# Patient Record
Sex: MALE | Race: WHITE
[De-identification: names, ages, dates, MRNs, and addresses within clinical notes are randomized per-mention and may not be internally consistent; named-entity substitution may affect disease eponyms.]

---

## 2019-10-08 ENCOUNTER — HOSPITAL ENCOUNTER (EMERGENCY)
Dept: HOSPITAL 41 - JD.ED | Age: 34
Discharge: HOME | End: 2019-10-08
Payer: COMMERCIAL
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
## 2019-10-08 DIAGNOSIS — Z91.018: ICD-10-CM
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
## 2019-10-08 DIAGNOSIS — F17.210: ICD-10-CM

## 2019-10-08 DIAGNOSIS — S91.341A: ICD-10-CM

## 2019-10-08 DIAGNOSIS — Z23: ICD-10-CM

## 2019-10-08 DIAGNOSIS — F31.2: ICD-10-CM

## 2019-10-08 DIAGNOSIS — S91.342A: Primary | ICD-10-CM
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
## 2019-10-08 DIAGNOSIS — Z79.899: ICD-10-CM

## 2019-10-08 PROCEDURE — 90700 DTAP VACCINE < 7 YRS IM: CPT

## 2019-10-08 PROCEDURE — 96361 HYDRATE IV INFUSION ADD-ON: CPT

## 2019-10-08 PROCEDURE — 90471 IMMUNIZATION ADMIN: CPT

## 2019-10-08 PROCEDURE — 96375 TX/PRO/DX INJ NEW DRUG ADDON: CPT

## 2019-10-08 PROCEDURE — 96376 TX/PRO/DX INJ SAME DRUG ADON: CPT

## 2019-10-08 PROCEDURE — 99283 EMERGENCY DEPT VISIT LOW MDM: CPT

## 2019-10-08 PROCEDURE — 73700 CT LOWER EXTREMITY W/O DYE: CPT

## 2019-10-08 PROCEDURE — 96374 THER/PROPH/DIAG INJ IV PUSH: CPT

## 2019-10-08 PROCEDURE — 99152 MOD SED SAME PHYS/QHP 5/>YRS: CPT

## 2019-10-08 PROCEDURE — 99153 MOD SED SAME PHYS/QHP EA: CPT

## 2019-10-08 NOTE — CT
CT right foot

 

Technique: Multiple axial sections through the right foot were 

obtained.  Reconstructed coronal and sagittal images were obtained.

 

Findings: Small superficial foreign body seen slightly proximal to the

 heel along the lateral side measuring about 1.3 mm.  

 

Second small foreign body superficial in location is seen more 

distally within the sole of the foot laterally.  This measures about 

1.3 mm in size.

 

Larger foreign body is seen within the ball of the right foot medially

 which again is superficial in location and measuring about 2.3 mm.

 

Largest abnormality is within the mid foot slightly past the ball of 

the foot and measures 5 mm.

 

Small superficial foreign body is seen within the medial base of the 

first toe.

 

No additional foreign body is seen within the right foot.  No fracture

 or other bony abnormality is identified.

 

Impression:

1.  Five small foreign bodies within the right foot as described 

above.  These are all located along the sole of the foot.

 

Diagnostic code #3

## 2019-10-08 NOTE — CT
CT left foot



Technique: Multiple axial sections through the left foot were obtained.  
Reconstructed coronal and sagittal images were reviewed.



Findings: Small foreign body which appears to be on top of the skin within the 
heel of the foot.  This measures about 8 mm.

 

Superficially located foreign body is seen more medially within the ball of the 
foot measuring 4.3 mm.  



Two foreign bodies are seen more distally within the ball of the foot one being 
located within the fat and is deep to the skin measuring 4 mm.  Additional 
abnormality is seen in this same region in a more midline location of the sole 
the foot measuring 3.3 mm and is located within the skin. 



Small superficial foreign body is seen slightly past the heel within the mid 
aspect of the foot measuring 1.5 mm located within the skin.  



Very minimal foreign body is seen within the lateral ball of the foot too small 
to accurately measure.  



Larger abnormality is seen at the level of the mid proximal phalanx between the 
second and third digits within the distal ball of the foot.  This is located 
slightly deep to the skin within the subcutaneous fat measuring 2.9 mm.  



Two adjacent foreign bodies are seen within the first toe slightly medial in 
location and located at the level of the distal phalanx.  Largest finding 
measures 2.5 mm and adjacent finding measures 1 mm or less.



No bony abnormality is seen.



Impression:

1.  Nine small foreign bodies within the sole of the foot as noted above. 



Diagnostic code #3
MTDD
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
## 2019-10-09 NOTE — CT
CT left foot



Technique: Multiple axial sections through the left foot were obtained.  
Reconstructed coronal and sagittal images were reviewed.



Findings:  Foreign body within the deep soft tissues of the heel persist from 
previous exam.  This measures about 4 mm in size.  This is located 
approximately 1.1 cm below the skin.  



Two additional very small foreign bodies are seen within the skin within the 
medial heel.



Small lesion within the skin is seen which is within the mid sole of the foot 
measuring 2.4 mm in size.  This is located within the lateral aspect.  



Additional foreign body is seen within the ball of the foot extending from the 
skin deep into the soft tissues by approximately 1.3 cm.  This is located 
medially.



Diffuse subcutaneous soft tissue swelling as well as skin thickening is seen.  
No bony abnormality is seen.



Impression:

1.  Five persisting foreign bodies within the left foot.  Largest abnormality 
within the medial ball of the foot measuring 1.3 cm.



Diagnostic code #3
MTDD

## 2019-10-09 NOTE — CT
CT right foot



Technique: Multiple axial sections through the right foot were obtained.  
Reconstructed coronal and sagittal images were obtained.



Comparison: Previous study performed earlier on the same day.



Findings: Small foreign bodies are seen superficially within the first toe at 
the level of the IP joint.  This finding measures about 2 mm in size.  



Small foreign body noted laterally at the level of the distal fifth metatarsal 
head which is linear in configuration with greatest measurement of 4 mm.  



Two small adjacent foreign bodies are seen superficially within the mid sole of 
the foot at the level of the base of the fifth metatarsal along the lateral 
aspect.  These measure 1-2 mm in size.  



Very minimal foreign body is noted within the heel measuring about 1 mm in size 
located within the skin.



Soft tissue swelling is seen within the subcutaneous tissues as well as mild 
skin thickening.  No acute bony abnormality is seen.



Impression:

1.  Five small to very small foreign bodies persist within the right foot.  
Larger foreign bodies appear to have been removed.  Some foreign bodies appear 
to represent an interval change from prior exam which most likely represent 
differences in CT sectioning due to their small size.

2.  Soft tissue swelling within the subcutaneous tissues as well as skin 
thickening.



Diagnostic code #3
MTDD

## 2020-01-29 ENCOUNTER — HOSPITAL ENCOUNTER (EMERGENCY)
Dept: HOSPITAL 41 - JD.ED | Age: 35
LOS: 1 days | Discharge: HOME | End: 2020-01-30
Payer: COMMERCIAL

## 2020-01-29 DIAGNOSIS — Z79.899: ICD-10-CM

## 2020-01-29 DIAGNOSIS — F31.9: ICD-10-CM

## 2020-01-29 DIAGNOSIS — F17.210: ICD-10-CM

## 2020-01-29 DIAGNOSIS — F20.9: Primary | ICD-10-CM

## 2020-01-29 DIAGNOSIS — Z91.018: ICD-10-CM

## 2020-01-29 PROCEDURE — 99283 EMERGENCY DEPT VISIT LOW MDM: CPT
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
## 2020-08-03 NOTE — EDM.PDOCBH
ED HPI GENERAL MEDICAL PROBLEM





- General


Chief Complaint: Behavioral/Psych


Stated Complaint: PSYCH PT OFF MEDS


Time Seen by Provider: 08/03/20 20:14


Source of Information: Reports: Patient, Family (Friend)


History Limitations: Reports: No Limitations





- History of Present Illness


INITIAL COMMENTS - FREE TEXT/NARRATIVE: 





Mr. Reinoso is a very pleasant 34-year-old gentleman with a past medical history 

significant for bipolar I disorder and schizophrenia, both diagnosed when he was

20 years of age, with at least 10 hospitalizations for decompensation of 

symptoms when noncompliant with medications, most recently this last spring, who

is now brought to the ED by his friend and roommate, after the patient left 

their residence more than a week ago.  The patient's friend ordinarily gives the

patient his medications, however, after he left, she was unable to, therefore he

has been off of his psychiatric medications for more than a week.  The patient 

states that he met a girl, and that they have been snorting and smoking 

methamphetamine and drinking alcohol.  Whether or not any of that is true is 

questionable, although the patient's friend does confirm that the patient has a 

significant history of drug abuse.  The patient's friend tells me that she and 

her roommates have been looking for the patient for the past week, and that they

then found him today either in or under a drainage viaduct this evening.  He 

acknowledges that he has not really eaten in the past week.





Here in the ED, the patient is found to be hemodynamically stable, afebrile, 

saturating 100% on room air.  He has well-developed scabs on his upper right 

forehead, chest, abdomen, bilateral forearms, bilateral knees, and left shin.  

He states that he fell down a concrete dam about a week ago.





Other than his abrasive injuries and psychiatric issues, the patient denies 

recent fever, chills, sore throat, ear pain, nasal or sinus congestion, cough, 

dyspnea, chest pain, palpitations, nausea, vomiting, constipation, diarrhea, 

abdominal pain, urinary symptoms, recent weight gain or weight loss, recent 

bloody bowel movements or black bowel movements, recent joint aches, headaches, 

or rashes.








The patient does not have a PCP.


His Psychiatrist is Dr. Bernie Lopez.  He last saw her on 6/15/2020.











- Related Data


                                    Allergies











Allergy/AdvReac Type Severity Reaction Status Date / Time


 


tree nut Allergy  Airway Verified 08/03/20 20:08





   Tightness  











Home Meds: 


                                    Home Meds





OLANZapine [ZyPREXA] 2.5 mg PO DAILY 10/08/19 [History]


ziprasidone HCL [Geodon] 20 mg PO BID 10/08/19 [History]


ClonazePAM [KlonoPIN] 0.5 mg PO BID 08/03/20 [History]


Divalproex Sodium [Divalproex Sodium ER] 2,000 mg PO BEDTIME 08/03/20 [History]


cloNIDine HCL [Clonidine HCl] 0.1 mg PO BEDTIME 08/03/20 [History]











Past Medical History


Gastrointestinal History: Reports: Hemorrhoids


Psychiatric History: Reports: Anxiety, Bipolar (Type I), Schizophrenia, Suicide 

Attempt (by slitting throat)





- Past Surgical History


GI Surgical History: Reports: Appendectomy, Other (See Below) (Hemorrhoidectomy)





Social & Family History





- Tobacco Use


Smoking Status *Q: Current Every Day Smoker


Years of Tobacco use: 22


Packs/Tins Daily: 1





- Caffeine Use


Caffeine Use: Reports: Coffee





- Alcohol Use


Alcohol Use History: Yes


Days Per Week of Alcohol Use: 7


Number of Drinks Per Day: 6


Total Drinks Per Week: 42





- Recreational Drug Use


Recreational Drug Use: Yes


Drug Use in Last 12 Months: Yes


Recreational Drug Type: Reports: Ecstasy (last took in 2018), Heroin (last 

snorted, smoked, injected early 2020), LSD (Acid) (last took when 16 yrs old), 

Marijuana/Hashish (last smoked 7/30/2020), Methamphetamine (last injected around

 7/28/2020), Psilocybin (Mushrooms) (last took when in his 20s), Other (see 

below) (Last abused opioids in 2018)





- Living Situation & Occupation


Living situation: Reports: Single, Other (with friends)


Occupation: Unemployed





ED ROS GENERAL





- Review of Systems


Review Of Systems: Comprehensive ROS is negative, except as noted in HPI.





ED EXAM, BEHAVIORAL HEALTH





- Physical Exam


Exam: See Below


Exam Limited By: No Limitations


General Appearance: Alert, No Apparent Distress, Thin


Eye Exam: Bilateral Eye: EOMI, Normal Inspection, PERRL


Ears: Normal External Exam, Normal Canal, Hearing Grossly Normal, Normal TMs


Nose: Normal Inspection, Normal Mucosa, No Blood


Throat/Mouth: Normal Inspection, Normal Lips, Normal Teeth, Normal Gums, Normal 

Oropharynx, Normal Voice, No Airway Compromise


Head: Normocephalic, Other (Well-developed scab on the upper right forehead)


Neck: Normal Inspection, Supple, Non-Tender, Full Range of Motion


Respiratory/Chest: No Respiratory Distress, Lungs Clear, Normal Breath Sounds, 

No Accessory Muscle Use, Other (Extensive well developed a scab extending from 

the lower chest to the lower abdomen)


Cardiovascular: Normal Peripheral Pulses, Regular Rate, Rhythm, No Edema, No 

Gallop, No JVD, No Murmur, No Rub


GI/Abdominal: Normal Bowel Sounds, Soft, Non-Tender, No Organomegaly, No D

istention, No Abnormal Bruit, No Mass


 (Male) Exam: Deferred


Rectal (Males) Exam: Deferred


Back Exam: Normal Inspection, Full Range of Motion, NT


Extremities: Normal Range of Motion, No Pedal Edema, Normal Capillary Refill, 

Other (Extensive scab on the volar aspect of the left forearm, and on the 

proximal volar aspect of the right forearm.  Scabs are present on both knees, 

and down the patient's left shin.)


Neurological: Alert, CN II-XII Intact, No Motor/Sensory Deficits, Oriented x 3


Psychiatric: Flat Affect


Skin Exam: Warm, Dry, Normal color, No rash





EKG INTERPRETATION


EKG Date: 08/03/20


Time: 21:16


Rhythm: NSR


Rate (Beats/Min): 88


Axis: Normal


P-Wave: Present


QRS: Normal


ST-T: Normal


QT: Normal


Comparison: NA - No Prior EKG





COURSE, BEHAVIORAL HEALTH COMP





- Course


Vital Signs: 


                                Last Vital Signs











Temp  36.4 C   08/03/20 20:08


 


Pulse  91   08/03/20 20:08


 


Resp  17   08/03/20 20:08


 


BP  132/97 H  08/03/20 20:08


 


Pulse Ox  100   08/03/20 20:08











Orders, Labs, Meds: 


                               Active Orders 24 hr











 Category Date Time Status


 


 EKG Documentation Completion [RC] STAT Care  08/03/20 20:36 Active


 


 Head wo Cont [CT] Stat Exams  08/03/20 20:54 Taken








                                Laboratory Tests











  08/03/20 08/03/20 08/03/20 Range/Units





  21:10 21:10 21:10 


 


WBC  10.34 H    (4.23-9.07)  K/mm3


 


RBC  5.63    (4.63-6.08)  M/mm3


 


Hgb  16.7    (13.7-17.5)  gm/dl


 


Hct  49.0    (40.1-51.0)  %


 


MCV  87.0    (79.0-92.2)  fl


 


MCH  29.7    (25.7-32.2)  pg


 


MCHC  34.1    (32.2-35.5)  g/dl


 


RDW Std Deviation  45.2 H    (35.1-43.9)  fL


 


Plt Count  411 H    (163-337)  K/mm3


 


MPV  10.2    (9.4-12.3)  fl


 


Neutrophils % (Manual)  81 H    (40-60)  %


 


Band Neutrophils %  1    (0-10)  %


 


Lymphocytes % (Manual)  11 L    (20-40)  %


 


Atypical Lymphs %  0    %


 


Monocytes % (Manual)  5    (2-10)  %


 


Eosinophils % (Manual)  1    (0.8-7.0)  %


 


Basophils % (Manual)  1    (0.2-1.2)  


 


Platelet Estimate  Adequate    


 


RBC Morph Comment  Normal    


 


Sodium   136   (136-145)  mEq/L


 


Potassium   4.1   (3.5-5.1)  mEq/L


 


Chloride   99   ()  mEq/L


 


Carbon Dioxide   28   (21-32)  mEq/L


 


Anion Gap   13.1   (5-15)  


 


BUN   41 H   (7-18)  mg/dL


 


Creatinine   1.1   (0.7-1.3)  mg/dL


 


Est Cr Clr Drug Dosing   90.68   mL/min


 


Estimated GFR (MDRD)   > 60   (>60)  mL/min


 


BUN/Creatinine Ratio   37.3 H   (14-18)  


 


Glucose   148 H   ()  mg/dL


 


Calcium   9.6   (8.5-10.1)  mg/dL


 


Magnesium   2.1   (1.8-2.4)  mg/dl


 


Total Bilirubin   1.2 H   (0.2-1.0)  mg/dL


 


AST   19   (15-37)  U/L


 


ALT   29   (16-63)  U/L


 


Alkaline Phosphatase   59   ()  U/L


 


Total Protein   7.8   (6.4-8.2)  g/dl


 


Albumin   3.8   (3.4-5.0)  g/dl


 


Globulin   4.0   gm/dL


 


Albumin/Globulin Ratio   1.0   (1-2)  


 


TSH 3rd Generation   0.745   (0.358-3.74)  uIU/mL


 


Salicylates    1.9 L  (2.8-20)  mg/dL


 


Urine Opiates Screen     (KSQTJA=992)  


 


Ur Buprenorphine Scrn     (CUTOFF=10)  


 


Ur Oxycodone Screen     (ZAU0EM=520)  


 


Urine Methadone Screen     (RFT0UZ=060)  


 


Ur Propoxyphene Screen     (BWGAVW=246)  


 


Acetaminophen   0 L   (10-30)  ug/mL


 


Ur Barbiturates Screen     (RUTJSI=563)  


 


Ur Tricyclics Screen     (MDWZKV=742)  


 


Ur Phencyclidine Scrn     (CUTOFF=25)  


 


Ur Amphetamine Screen     (NBQPYR=901)  


 


U Methamphetamines Scrn     (BXROEQ=536)  


 


U Benzodiazepines Scrn     (MTHCTP=615)  


 


U Cocaine Metab Screen     (IQBOTF=054)  


 


U Marijuana (THC) Screen     (CUTOFF=50)  


 


Ethyl Alcohol   0.00   (0.00)  gm%


 


COVID-19 (REFUGIO)     (NEGATIVE)  














  08/03/20 08/03/20 Range/Units





  21:11 22:20 


 


WBC    (4.23-9.07)  K/mm3


 


RBC    (4.63-6.08)  M/mm3


 


Hgb    (13.7-17.5)  gm/dl


 


Hct    (40.1-51.0)  %


 


MCV    (79.0-92.2)  fl


 


MCH    (25.7-32.2)  pg


 


MCHC    (32.2-35.5)  g/dl


 


RDW Std Deviation    (35.1-43.9)  fL


 


Plt Count    (163-337)  K/mm3


 


MPV    (9.4-12.3)  fl


 


Neutrophils % (Manual)    (40-60)  %


 


Band Neutrophils %    (0-10)  %


 


Lymphocytes % (Manual)    (20-40)  %


 


Atypical Lymphs %    %


 


Monocytes % (Manual)    (2-10)  %


 


Eosinophils % (Manual)    (0.8-7.0)  %


 


Basophils % (Manual)    (0.2-1.2)  


 


Platelet Estimate    


 


RBC Morph Comment    


 


Sodium    (136-145)  mEq/L


 


Potassium    (3.5-5.1)  mEq/L


 


Chloride    ()  mEq/L


 


Carbon Dioxide    (21-32)  mEq/L


 


Anion Gap    (5-15)  


 


BUN    (7-18)  mg/dL


 


Creatinine    (0.7-1.3)  mg/dL


 


Est Cr Clr Drug Dosing    mL/min


 


Estimated GFR (MDRD)    (>60)  mL/min


 


BUN/Creatinine Ratio    (14-18)  


 


Glucose    ()  mg/dL


 


Calcium    (8.5-10.1)  mg/dL


 


Magnesium    (1.8-2.4)  mg/dl


 


Total Bilirubin    (0.2-1.0)  mg/dL


 


AST    (15-37)  U/L


 


ALT    (16-63)  U/L


 


Alkaline Phosphatase    ()  U/L


 


Total Protein    (6.4-8.2)  g/dl


 


Albumin    (3.4-5.0)  g/dl


 


Globulin    gm/dL


 


Albumin/Globulin Ratio    (1-2)  


 


TSH 3rd Generation    (0.358-3.74)  uIU/mL


 


Salicylates    (2.8-20)  mg/dL


 


Urine Opiates Screen   Negative  (LBYLYY=233)  


 


Ur Buprenorphine Scrn   Negative  (CUTOFF=10)  


 


Ur Oxycodone Screen   Negative  (YBU3WZ=503)  


 


Urine Methadone Screen   Negative  (INH2DK=496)  


 


Ur Propoxyphene Screen   Negative  (PPRECO=072)  


 


Acetaminophen    (10-30)  ug/mL


 


Ur Barbiturates Screen   Negative  (VIRMWV=689)  


 


Ur Tricyclics Screen   Negative  (XCRROT=948)  


 


Ur Phencyclidine Scrn   Negative  (CUTOFF=25)  


 


Ur Amphetamine Screen   Negative  (CHFKPC=493)  


 


U Methamphetamines Scrn   Negative  (XVJVRX=200)  


 


U Benzodiazepines Scrn   Negative  (AOSLCR=924)  


 


U Cocaine Metab Screen   Negative  (GLSFHG=854)  


 


U Marijuana (THC) Screen   Presumptive positive H  (CUTOFF=50)  


 


Ethyl Alcohol    (0.00)  gm%


 


COVID-19 (REFUGIO)  Negative   (NEGATIVE)  








Medications














Discontinued Medications














Generic Name Dose Route Start Last Admin





  Trade Name Antolin  PRN Reason Stop Dose Admin


 


Lactated Ringer's  1,000 mls @ 999 mls/hr  08/03/20 22:15  08/03/20 22:30





  Ringers, Lactated  IV  08/03/20 23:15  999 mls/hr





  .BOLUS ONE   Administration











Medical Clearance: 





08/03/20 20:55


As above, the patient left the home that he ordinarily lives in about 1 week 

ago, and may have been smoking methamphetamine and drinking alcohol during that 

time.  He was [place] by his friend today, with 

large scabs on his right forehead, anterior chest, bilateral forearms, bilateral

 knees, and left shin.  He states that he fell down a concrete dam about a week 

ago.  Other than the scabs, his physical exam is grossly unremarkable, and he is

 cooperative, requesting psychiatric help.  I have ordered a standard 

psychiatric medical clearance panel, as well as a CT scan of his head without 

contrast.  He is currently eating a turkey sandwich, but I am not going to start

 any medicines at this time.








08/03/20 22:13


The patient's CBC is remarkable for WBC count mildly elevated at 10.34, but with

 only 1% bandemia.  His platelets are depressed at 211,000, with the remainder 

of his CBC being unremarkable.


His CMP is remarkable for a BUN mildly elevated at 41, but with a Cr normal at 

1.1.  His blood glucose is mildly elevated at 148.  His TBil is slightly 

elevated at 1.2, with the remainder of his CMP being unremarkable.


His magnesium level is within normal limits at 2.1.


His TSH is within normal limits at 0.745.


His acetaminophen level is 0.


His salicylate level is within normal limits at 1.9.


His EtOH level is 0.00.


His test for the SARS-CoV-2 virus returned negative.





The patient has not yet provided a urine sample for his urine drug screen.


He has not yet gone for the CT scan of his head without contrast.





Based on the patient's elevated BUN, I have ordered 1 L of LR.








08/03/20 23:21


The patient's urine drug screen is positive for marijuana, and is otherwise 

completely negative.








08/03/20 23:58


CT of the head without contrast is read by Michaela as "Right frontal scalp 

laceration.  No underline [sic] intracranial hemorrhage or calvarial fracture."








08/04/20 00:37


Case discussed with Suzie at [facility] at 00:28.





Case then discussed with Dr. Sindy Wilson, Psychiatrist at [facility],

 at 00:34.  She accepted the patient for transfer to their facility.  Even 

though the patient's friend may be willing to drive him to [place], she prefers

 that we put a 24-hour hold on him and have him transferred by the [facility]'s department, just so that he cannot change his mind.  She is aware that

 that will likely delay transfer until the morning.





Departure





- Departure


Time of Disposition: 00:38


Disposition: DC/Tfer to Psych Hosp/Unit [facility]


Condition: Good


Clinical Impression: 


 Schizophrenia, chronic condition with acute exacerbation, Bipolar affective 

disorder, currently active, Marijuana use








- Discharge Information


*PRESCRIPTION DRUG MONITORING PROGRAM REVIEWED*: Not Applicable


*COPY OF PRESCRIPTION DRUG MONITORING REPORT IN PATIENT SABINO: Not Applicable


Referrals: 


Bernie Lopez MD [Ordering Only Provider] - 


Forms:  ED Department Discharge





Sepsis Event Note (ED)





- Evaluation


Sepsis Screening Result: No Definite Risk





- Focused Exam


Vital Signs: 


                                   Vital Signs











  Temp Pulse Resp BP Pulse Ox


 


 08/03/20 20:08  36.4 C  91  17  132/97 H  100














- My Orders


Last 24 Hours: 


My Active Orders





08/03/20 20:36


EKG Documentation Completion [RC] STAT 





08/03/20 20:54


Head wo Cont [CT] Stat 














- Assessment/Plan


Last 24 Hours: 


My Active Orders





08/03/20 20:36


EKG Documentation Completion [RC] STAT 





08/03/20 20:54


Head wo Cont [CT] Stat

## 2020-08-04 NOTE — CT
Head CT

 

Technique: Multiple axial sections through the brain were obtained.  

Intravenous contrast was not utilized.

 

Comparison: No prior intracranial imaging is available.

 

Findings: Ventricles along with basal cisterns and sulci over the 

convexities are within normal limits for the patient's age.  No 

abnormal parenchymal densities are seen.  No evidence of intracranial 

hemorrhage.  No midline shift or mass-effect is appreciated.

 

Bone window settings were reviewed.  No acute calvarial finding is 

appreciated.  Scalp injury is identified anteriorly.  Visualized 

paranasal sinuses and mastoid sinuses show nothing acute.

 

Impression:

1.  Soft tissue injury within the anterior scalp.

2.  No acute intracranial abnormality is appreciated.  No acute 

calvarial abnormality is appreciated.

 

Diagnostic code #2

 

This report was dictated in MDT

 

I agree with preliminary report from ebony, finalized on 08/03/20, 

11:39 PM Central Daylight Time